# Patient Record
Sex: FEMALE | Race: WHITE | Employment: UNEMPLOYED | URBAN - METROPOLITAN AREA
[De-identification: names, ages, dates, MRNs, and addresses within clinical notes are randomized per-mention and may not be internally consistent; named-entity substitution may affect disease eponyms.]

---

## 2018-08-23 ENCOUNTER — HOSPITAL ENCOUNTER (EMERGENCY)
Age: 33
Discharge: HOME | End: 2018-08-23
Attending: OBSTETRICS & GYNECOLOGY | Admitting: OBSTETRICS & GYNECOLOGY
Payer: COMMERCIAL

## 2018-08-23 VITALS
SYSTOLIC BLOOD PRESSURE: 116 MMHG | HEART RATE: 71 BPM | BODY MASS INDEX: 45.99 KG/M2 | HEIGHT: 67 IN | DIASTOLIC BLOOD PRESSURE: 78 MMHG | TEMPERATURE: 98.1 F | WEIGHT: 293 LBS | OXYGEN SATURATION: 100 % | RESPIRATION RATE: 16 BRPM

## 2018-08-23 PROCEDURE — 59025 FETAL NON-STRESS TEST: CPT

## 2018-08-23 RX ORDER — METHYLPHENIDATE HYDROCHLORIDE 10 MG/1
10 TABLET ORAL DAILY
COMMUNITY
End: 2018-08-26

## 2018-08-23 NOTE — IP AVS SNAPSHOT
[address] Patient: Mayra Zuñiga MRN: [id number] UAV:[id number] About your hospitalization You were admitted on:  N/A You last received care in the:  [address] [place] [address] You were discharged on:  August 23, 2018 Why you were hospitalized Your primary diagnosis was:  Not on File Follow-up Information None Discharge Orders None A check tonio indicates which time of day the medication should be taken. My Medications ASK your doctor about these medications Instructions Each Dose to Equal  
 Morning Noon Evening Bedtime CALCIUM 600 WITH VITAMIN D3 600 mg(1,500mg) -400 unit Cap Generic drug:  Calcium-Cholecalciferol (D3) Your last dose was: Your next dose is: Take  by mouth daily. FISH OIL 1,000 mg Cap Generic drug:  omega-3 fatty acids-vitamin e Your last dose was: Your next dose is: Take 1 Cap by mouth. Indications: 20935 mg  
 1 Cap  
    
   
   
   
  
 ibuprofen 800 mg tablet Commonly known as:  MOTRIN Your last dose was: Your next dose is: Take 1 Tab by mouth every eight (8) hours as needed for Pain. 800 mg  
    
   
   
   
  
 pnv w/o calcium-iron fum-fa 27-1 mg Tab Your last dose was: Your next dose is: Take  by mouth. PROzac 20 mg capsule Generic drug:  FLUoxetine Your last dose was: Your next dose is: Take 20 mg by mouth daily. Indications: ADD/anxiety 20 mg  
    
   
   
   
  
 RITALIN 10 mg tablet Generic drug:  methylphenidate HCl Your last dose was: Your next dose is: Take 10 mg by mouth daily. Indications: Attention-Deficit Hyperactivity Disorder 10 mg Discharge Instructions Tomorrow you have a schedule induction at 0730 at [facility]. Please call back at 0500 am to confirm bed availability at [phone]. You are allowed to eat till midnight. No food after midnight allowed. Introducing \[facility]\"" & HEALTH SERVICES! Seb Harkins introduces [facility] patient portal. Now you can access parts of your medical record, email your doctor's office, and request medication refills online. 1. In your internet browser, go to [email or web address] 2. Click on the First Time User? Click Here link in the Sign In box. You will see the New Member Sign Up page. 3. Enter your [facility] Access Code exactly as it appears below. You will not need to use this code after youve completed the sign-up process. If you do not sign up before the expiration date, you must request a new code. · [facility] Access Code: [email or web address] Expires: 11/21/2018  4:24 PM 
 
4. Enter the last four digits of your Social Security Number (xxxx) and Date of Birth (mm/dd/yyyy) as indicated and click Submit. You will be taken to the next sign-up page. 5. Create a Care[facility] ID. This will be your Care[facility]Sync login ID and cannot be changed, so think of one that is secure and easy to remember. 6. Create a [facility] password. You can change your password at any time. 7. Enter your Password Reset Question and Answer. This can be used at a later time if you forget your password. 8. Enter your e-mail address. You will receive e-mail notification when new information is available in [address] 9. Click Sign Up. You can now view and download portions of your medical record. 10. Click the Download Summary menu link to download a portable copy of your medical information. If you have questions, please visit the Frequently Asked Questions section of the [facility] website. Remember, [facility] is NOT to be used for urgent needs. For medical emergencies, dial 911. Now available from your iPhone and Android! Introducing Roberto Lebron As a [facility] patient, I wanted to make you aware of our electronic visit tool called Roberto Lebron. [email or web address] 24/7 allows you to connect within minutes with a medical provider 24 hours a day, seven days a week via a mobile device or tablet or logging into a secure website from your computer. You can access Roberto Lebron from anywhere in the [place]. A virtual visit might be right for you when you have a simple condition and feel like you just dont want to get out of bed, or cant get away from work for an appointment, when your regular [facility] provider is not available (evenings, weekends or holidays), or when youre out of town and need minor care. Electronic visits cost only $49 and if the [facility] 24/7 provider determines a prescription is needed to treat your condition, one can be electronically transmitted to a nearby pharmacy*. Please take a moment to enroll today if you have not already done so. The enrollment process is free and takes just a few minutes. To enroll, please download the [employer]Want[employer] kayleen to your tablet or phone, or visit [email or web address] to enroll on your computer. And, as an [address] [place] [address] patient with a [employer] account, the results of your visits will be scanned into your electronic medical record and your primary care provider will be able to view the scanned results. We urge you to continue to see your regular [facility] provider for your ongoing medical care. And while your primary care provider may not be the one available when you seek a Roberto Lebron virtual visit, the peace of mind you get from getting a real diagnosis real time can be priceless. For more information on Roberto Lebron, view our Frequently Asked Questions (FAQs) at [email or web address]. Sincerely, 
 
Fady Wang MD 
Chief Medical Officer Zeyad Michael *:  certain medications cannot be prescribed via Roberto Lebron Providers Seen During Your Hospitalization Provider Specialty Primary office phone Yuliet Pritchett 7 Gynecology [phone] Your Primary Care Physician (PCP) Primary Care Physician Office Phone Office Fax NONE ** None ** ** None ** You are allergic to the following Allergen Reactions Egg Derived Itching Swelling Amoxicillin Hives Pcn (Penicillins) Hives Recent Documentation Height Weight BMI OB Status Smoking Status 1.702 m 134.3 kg 46.36 kg/m2 Pregnant Never Smoker Emergency Contacts Name Discharge Info Relation Home Work Mobile Hetal DÍZA  Spouse [3] [phone] Patient Belongings The following personal items are in your possession at time of discharge: 
                             
 
  
  
Discharge Instructions Attachments/References POLYHYDRAMNIOS: GENERAL INFO (ENGLISH) PREGNANCY: PRECAUTIONS (ENGLISH) PREGNANCY: WEEK 40 (ENGLISH) PREGNANCY: KICK COUNTS (ENGLISH) Patient Handouts Learning About Having Too Much Amniotic Fluid What is too much amniotic fluid? While you are pregnant, amniotic fluid in the uterus protects your baby from being bumped or hurt as you move your body. And it keeps your baby at a healthy temperature. The fluid also helps your baby move around. Having too much of this fluid is called polyhydramnios. It means that there's more fluid around your baby than there should be. In some cases, too much amniotic fluid doesn't cause problems. In other cases, it can cause problems such as  labor. Or it may increase your chance of needing a  delivery (). What causes it? The cause of too much amniotic fluid may not be found. But causes may include: · Diabetes in the mother. This includes diabetes that occurs during pregnancy (gestational diabetes). · Problems with the baby's development. Examples are genetic disorders and birth defects. · Being pregnant with twins or more. What are the symptoms? You may not have any symptoms of too much amniotic fluid. Often it's found during a routine ultrasound. But some women do have symptoms, which may include: · A uterus that is larger than expected for the age of the pregnancy. Your doctor will find this out. · Shortness of breath. · Signs of early labor ( contractions). How is it diagnosed? Your doctor can use ultrasound to see if you have too much amniotic fluid. This test is used to measure the pockets of amniotic fluid that surround the baby. If these measurements show too much fluid, more tests may be done to try to find the cause. How is it treated? The type of treatment you get depends on how much amniotic fluid you have. It also depends on how far along you are in your pregnancy and what your symptoms are. Your doctor may use a needle to remove the fluid from the amniotic sac. Or you may be given medicine to help reduce the amount of fluid. Some women may get both treatments or more than one round of treatment. In some cases, if your pregnancy has progressed far enough, your doctor may recommend having your baby early. Other women may not need treatment. Sometimes the problem may correct itself over time. Whether or not you need treatment, you will still be watched closely throughout your pregnancy and labor. Follow-up care is a key part of your treatment and safety. Be sure to make and go to all appointments, and call your doctor if you are having problems. It's also a good idea to know your test results and keep a list of the medicines you take. Where can you learn more? Go to http://jayden-catherine.info/. Enter P121 in the search box to learn more about \"Learning About Having Too Much Amniotic Fluid. \" Current as of: 2017 Content Version: 11.7 © 8808-4403 [employer]. Care instructions adapted under license by [employer] (which disclaims liability or warranty for this information). If you have questions about a medical condition or this instruction, always ask your healthcare professional. [address] any warranty or liability for your use of this information. Pregnancy Precautions: Care Instructions Your Care Instructions There is no sure way to prevent labor before your due date ( labor) or to prevent most other pregnancy problems. But there are things you can do to increase your chances of a healthy pregnancy. Go to your appointments, follow your doctor's advice, and take good care of yourself. Eat well, and exercise (if your doctor agrees). And make sure to drink plenty of water. Follow-up care is a key part of your treatment and safety. Be sure to make and go to all appointments, and call your doctor if you are having problems. It's also a good idea to know your test results and keep a list of the medicines you take. How can you care for yourself at home? · Make sure you go to your prenatal appointments. At each visit, your doctor will check your blood pressure. Your doctor will also check to see if you have protein in your urine. High blood pressure and protein in urine are signs of preeclampsia. This condition can be dangerous for you and your baby. · Drink plenty of fluids, enough so that your urine is light yellow or clear like water. Dehydration can cause contractions. If you have kidney, heart, or liver disease and have to limit fluids, talk with your doctor before you increase the amount of fluids you drink. · Tell your doctor right away if you notice any symptoms of an infection, such as: ¨ Burning when you urinate. ¨ A foul-smelling discharge from your vagina. ¨ Vaginal itching. ¨ Unexplained fever. ¨ Unusual pain or soreness in your uterus or lower belly. · Eat a balanced diet. Include plenty of foods that are high in calcium and iron. ¨ Foods high in calcium include milk, cheese, yogurt, almonds, and broccoli. ¨ Foods high in iron include red meat, shellfish, poultry, eggs, beans, raisins, whole-grain bread, and leafy green vegetables. · Do not smoke. If you need help quitting, talk to your doctor about stop-smoking programs and medicines. These can increase your chances of quitting for good. · Do not drink alcohol or use illegal drugs. · Follow your doctor's directions about activity. Your doctor will let you know how much, if any, exercise you can do. · Ask your doctor if you can have sex. If you are at risk for early labor, your doctor may ask you to not have sex. · Take care to prevent falls. During pregnancy, your joints are loose, and your balance is off. Sports such as bicycling, skiing, or in-line skating can increase your risk of falling. And don't ride horses or motorcycles, dive, water ski, scuba dive, or parachute jump while you are pregnant. · Avoid getting very hot. Do not use saunas or hot tubs. Avoid staying out in the sun in hot weather for long periods. Take acetaminophen (Tylenol) to lower a high fever. · Do not take any over-the-counter or herbal medicines or supplements without talking to your doctor or pharmacist first. 
When should you call for help? Call 911 anytime you think you may need emergency care. For example, call if: 
  · You passed out (lost consciousness).  
  · You have severe vaginal bleeding.  
  · You have severe pain in your belly or pelvis.  
  · You have had fluid gushing or leaking from your vagina and you know or think the umbilical cord is bulging into your vagina. If this happens, immediately get down on your knees so your rear end (buttocks) is higher than your head. This will decrease the pressure on the cord until help arrives. ·  
 Call your doctor now or seek immediate medical care if:   · You have signs of preeclampsia, such as: 
¨ Sudden swelling of your face, hands, or feet. ¨ New vision problems (such as dimness or blurring). ¨ A severe headache.  
  · You have any vaginal bleeding.  
  · You have belly pain or cramping.  
  · You have a fever.  
  · You have had regular contractions (with or without pain) for an hour. This means that you have 8 or more within 1 hour or 4 or more in 20 minutes after you change your position and drink fluids.  
  · You have a sudden release of fluid from your vagina.  
  · You have low back pain or pelvic pressure that does not go away.  
  · You notice that your baby has stopped moving or is moving much less than normal.  
 Watch closely for changes in your health, and be sure to contact your doctor if you have any problems. Where can you learn more? Go to http://jayden-catherine.info/. Enter [id number] in the search box to learn more about \"Pregnancy Precautions: Care Instructions. \" Current as of: November 21, 2017 Content Version: 11.7 © 7070-6759 [employer]. Care instructions adapted under license by [employer] (which disclaims liability or warranty for this information). If you have questions about a medical condition or this instruction, always ask your healthcare professional. [place] [address] any warranty or liability for your use of this information. Week 40 of Your Pregnancy: Care Instructions Your Care Instructions By week 36, you have reached your due date. Your baby could be coming any day. But it's a good idea to think ahead to the next few weeks and what might happen. If this is your first time having a baby, try not to worry. If you don't start labor on your own by 41 or 42 weeks, your doctor may recommend giving you medicines to start labor. This care sheet gives you information about how labor can be started.  It also gives you some ideas about breathing exercises you can do if you start to feel anxious or if you are trying to relax. Follow-up care is a key part of your treatment and safety. Be sure to make and go to all appointments, and call your doctor if you are having problems. It's also a good idea to know your test results and keep a list of the medicines you take. How can you care for yourself at home? Learn how labor can be started · If you and your baby are both healthy and ready, and if your cervix has started to open, your doctor may \"break your water\" (rupture the amniotic sac). This often starts labor. · If your cervix is not quite ready, you may get a medicine called Pitocin through an IV to start contractions. · If your cervix is still very firm, you may have prostaglandin tablets (misoprostol) placed in your vagina to soften the cervix. Try guided imagery to help you relax · Find a comfortable place to sit or lie down. Close your eyes. · Start by just taking a few deep breaths to help you relax. · Picture a setting that is calm and peaceful. This could be a beach, a mountain setting, a meadow, or a scene that you choose. · Imagine your scene, and try to add some detail. For example, is there a breeze? What does the pineda look like? Is it clear, or are there clouds? · It often helps to add a path to your scene. For example, as you enter the meadow, imagine a path leading you through the meadow to the trees on the other side. As you follow the path farther into the [facility] you feel more and more relaxed. · When you are deep into your scene and are feeling relaxed, take a few minutes to breathe slowly and feel the calm. · When you are ready, slowly take yourself out of the scene back to the present. Tell yourself that you will feel relaxed and refreshed and will bring that sense of calm with you. · Count to 3, and open your eyes. Where can you learn more? Go to http://jayden-catherine.info/. Enter I070 in the search box to learn more about \"Week 40 of Your Pregnancy: Care Instructions. \" Current as of: November 21, 2017 Content Version: 11.7 © 9091-9207 [employer]. Care instructions adapted under license by [employer] (which disclaims liability or warranty for this information). If you have questions about a medical condition or this instruction, always ask your healthcare professional. [place] [address] any warranty or liability for your use of this information. Counting Your Baby's Kicks: Care Instructions Your Care Instructions Counting your baby's kicks is one way your doctor can tell that your baby is healthy. Most women-especially in a first pregnancy-feel their baby move for the first time between 16 and 22 weeks. The movement may feel like flutters rather than kicks. Your baby may move more at certain times of the day. When you are active, you may notice less kicking than when you are resting. At your prenatal visits, your doctor will ask whether the baby is active. In your last trimester, your doctor may ask you to count the number of times you feel your baby move. Follow-up care is a key part of your treatment and safety. Be sure to make and go to all appointments, and call your doctor if you are having problems. It's also a good idea to know your test results and keep a list of the medicines you take. How do you count fetal kicks? · A common method of checking your baby's movement is to count the number of kicks or moves you feel in 1 hour. Ten movements (such as kicks, flutters, or rolls) in 1 hour are normal. Some doctors suggest that you count in the morning until you get to 10 movements. Then you can quit for that day and start again the next day. · Pick your baby's most active time of day to count. This may be any time from morning to evening. · If you do not feel 10 movements in an hour, your baby may be sleeping. Wait for the next hour and count again. When should you call for help? Call your doctor now or seek immediate medical care if: 
  · You noticed that your baby has stopped moving or is moving much less than normal.  
 Watch closely for changes in your health, and be sure to contact your doctor if you have any problems. Where can you learn more? Go to http://jayden-catherine.info/. Enter Q850 in the search box to learn more about \"Counting Your Baby's Kicks: Care Instructions. \" Current as of: November 21, 2017 Content Version: 11.7 © 7320-4426 [employer]. Care instructions adapted under license by [employer] (which disclaims liability or warranty for this information). If you have questions about a medical condition or this instruction, always ask your healthcare professional. [address] any warranty or liability for your use of this information. Please provide this summary of care documentation to your next provider. Signatures-by signing, you are acknowledging that this After Visit Summary has been reviewed with you and you have received a copy. Patient Signature:  ____________________________________________________________ Date:  ____________________________________________________________  
  
Josph Perfect Provider Signature:  ____________________________________________________________ Date:  ____________________________________________________________

## 2018-08-23 NOTE — IP AVS SNAPSHOT
Summary of Care Report The Summary of Care report has been created to help improve care coordination. Users with access to Charge Payment or [address] (Web-based application) may access additional patient information including the Discharge Summary. If you are not currently a [address] user and need more information, please call the number listed below in the [place] section and ask to be connected with Medical Records. Facility Information Name Address Phone [facility] [address] [facility] [address] [phone] Patient Information Patient Name Sex SUDHIR Lovelace ([id number]) Female 1985 Discharge Information Admitting Provider Service Area Unit Ne Brady MD / Harjinder Acosta 92 3 Labor & Delivery / [phone] Discharge Provider Discharge Date/Time Discharge Disposition Destination (none) (none) (none) (none) Patient Language Language ENGLISH [13] Non-Hospital Problems as of 2018  Never Reviewed Class Noted - Resolved Last Modified Active Problems Supervision of other normal pregnancy  2015 - Present 2015 by Lashae Hoyt NP Entered by Lashae Hoyt NP You are allergic to the following Allergen Reactions Egg Derived Itching Swelling Amoxicillin Hives Pcn (Penicillins) Hives Current Discharge Medication List  
  
ASK your doctor about these medications Dose & Instructions Dispensing Information Comments CALCIUM 600 WITH VITAMIN D3 600 mg(1,500mg) -400 unit Cap Generic drug:  Calcium-Cholecalciferol (D3) Take  by mouth daily. Refills:  0  
   
 FISH OIL 1,000 mg Cap Generic drug:  omega-3 fatty acids-vitamin e Dose:  1 Cap Take 1 Cap by mouth. Indications: 32429 mg Refills:  0 ibuprofen 800 mg tablet Commonly known as:  MOTRIN Dose:  800 mg Take 1 Tab by mouth every eight (8) hours as needed for Pain. Quantity:  30 Tab Refills:  1 pnv w/o calcium-iron fum-fa 27-1 mg Tab Take  by mouth. Refills:  0 PROzac 20 mg capsule Generic drug:  FLUoxetine Dose:  20 mg Take 20 mg by mouth daily. Indications: ADD/anxiety Refills:  0  
   
 RITALIN 10 mg tablet Generic drug:  methylphenidate HCl Dose:  10 mg Take 10 mg by mouth daily. Indications: Attention-Deficit Hyperactivity Disorder Refills:  0 Follow-up Information None Discharge Instructions Tomorrow you have a schedule induction at 0730 at [facility]. Please call back at 0500 am to confirm bed availability at [phone]. You are allowed to eat till midnight. No food after midnight allowed. Chart Review Routing History No Routing History on File

## 2018-08-23 NOTE — DISCHARGE INSTRUCTIONS
Tomorrow you have a schedule induction at 0730 at [facility]. Please call back at 0500 am to confirm bed availability at [phone]. You are allowed to eat till midnight. No food after midnight allowed.

## 2018-08-23 NOTE — PROGRESS NOTES
0851:, Pt placed in monitors. Denies headache, vision changes, or epigastric pain, denies fluid leakage. Pt NST due to polyhydramnios. 1540: Offered patient apple juice. 1700: Pt discharged, induction schedule for tomorrow at 0730. Advised to call at 0500 to check bed availability. Discharge instructions given.

## 2018-08-24 ENCOUNTER — HOSPITAL ENCOUNTER (INPATIENT)
Age: 33
LOS: 2 days | Discharge: HOME | End: 2018-08-26
Attending: OBSTETRICS & GYNECOLOGY | Admitting: OBSTETRICS & GYNECOLOGY
Payer: COMMERCIAL

## 2018-08-24 PROBLEM — O40.9XX0 POLYHYDRAMNIOS AFFECTING PREGNANCY: Status: RESOLVED | Noted: 2018-08-24 | Resolved: 2018-08-24

## 2018-08-24 PROBLEM — Z87.59 HISTORY OF POSTPARTUM DEPRESSION: Status: ACTIVE | Noted: 2018-08-24

## 2018-08-24 PROBLEM — E66.9 OBESITY: Status: ACTIVE | Noted: 2018-08-24

## 2018-08-24 PROBLEM — F41.9 ANXIETY: Status: ACTIVE | Noted: 2018-08-24

## 2018-08-24 PROBLEM — O40.9XX0 POLYHYDRAMNIOS AFFECTING PREGNANCY: Status: ACTIVE | Noted: 2018-08-24

## 2018-08-24 PROBLEM — Z86.59 HISTORY OF POSTPARTUM DEPRESSION: Status: ACTIVE | Noted: 2018-08-24

## 2018-08-24 LAB
ABO + RH BLD: NORMAL
BASOPHILS # BLD: 0 K/UL (ref 0–0.1)
BASOPHILS NFR BLD: 0 % (ref 0–2)
BLOOD GROUP ANTIBODIES SERPL: NORMAL
DIFFERENTIAL METHOD BLD: ABNORMAL
EOSINOPHIL # BLD: 0.1 K/UL (ref 0–0.4)
EOSINOPHIL NFR BLD: 1 % (ref 0–5)
ERYTHROCYTE [DISTWIDTH] IN BLOOD BY AUTOMATED COUNT: 14 % (ref 11.6–14.5)
HCT VFR BLD AUTO: 33 % (ref 35–45)
HGB BLD-MCNC: 11 G/DL (ref 12–16)
LYMPHOCYTES # BLD: 1.7 K/UL (ref 0.9–3.6)
LYMPHOCYTES NFR BLD: 16 % (ref 21–52)
MCH RBC QN AUTO: 30.2 PG (ref 24–34)
MCHC RBC AUTO-ENTMCNC: 33.3 G/DL (ref 31–37)
MCV RBC AUTO: 90.7 FL (ref 74–97)
MONOCYTES # BLD: 0.7 K/UL (ref 0.05–1.2)
MONOCYTES NFR BLD: 7 % (ref 3–10)
NEUTS SEG # BLD: 8 K/UL (ref 1.8–8)
NEUTS SEG NFR BLD: 76 % (ref 40–73)
PLATELET # BLD AUTO: 224 K/UL (ref 135–420)
PMV BLD AUTO: 11.6 FL (ref 9.2–11.8)
RBC # BLD AUTO: 3.64 M/UL (ref 4.2–5.3)
SPECIMEN EXP DATE BLD: NORMAL
WBC # BLD AUTO: 10.5 K/UL (ref 4.6–13.2)

## 2018-08-24 PROCEDURE — 75410000000 HC DELIVERY VAGINAL/SINGLE

## 2018-08-24 PROCEDURE — 10907ZC DRAINAGE OF AMNIOTIC FLUID, THERAPEUTIC FROM PRODUCTS OF CONCEPTION, VIA NATURAL OR ARTIFICIAL OPENING: ICD-10-PCS | Performed by: OBSTETRICS & GYNECOLOGY

## 2018-08-24 PROCEDURE — 65270000029 HC RM PRIVATE

## 2018-08-24 PROCEDURE — 85025 COMPLETE CBC W/AUTO DIFF WBC: CPT | Performed by: ADVANCED PRACTICE MIDWIFE

## 2018-08-24 PROCEDURE — 74011250637 HC RX REV CODE- 250/637: Performed by: ADVANCED PRACTICE MIDWIFE

## 2018-08-24 PROCEDURE — 75410000003 HC RECOV DEL/VAG/CSECN EA 0.5 HR

## 2018-08-24 PROCEDURE — 74011250636 HC RX REV CODE- 250/636: Performed by: ADVANCED PRACTICE MIDWIFE

## 2018-08-24 PROCEDURE — 75410000002 HC LABOR FEE PER 1 HR

## 2018-08-24 PROCEDURE — 86900 BLOOD TYPING SEROLOGIC ABO: CPT | Performed by: ADVANCED PRACTICE MIDWIFE

## 2018-08-24 PROCEDURE — 77010026065 HC OXYGEN MINIMUM MEDICAL AIR

## 2018-08-24 RX ORDER — TERBUTALINE SULFATE 1 MG/ML
0.25 INJECTION SUBCUTANEOUS
Status: DISCONTINUED | OUTPATIENT
Start: 2018-08-24 | End: 2018-08-24

## 2018-08-24 RX ORDER — SALICYLIC ACID
90 POWDER (GRAM) MISCELLANEOUS ONCE
Status: COMPLETED | OUTPATIENT
Start: 2018-08-24 | End: 2018-08-24

## 2018-08-24 RX ORDER — MISOPROSTOL 200 UG/1
800 TABLET ORAL
Status: DISCONTINUED | OUTPATIENT
Start: 2018-08-24 | End: 2018-08-24

## 2018-08-24 RX ORDER — FLUOXETINE HYDROCHLORIDE 20 MG/1
20 CAPSULE ORAL DAILY
Status: DISCONTINUED | OUTPATIENT
Start: 2018-08-25 | End: 2018-08-26 | Stop reason: HOSPADM

## 2018-08-24 RX ORDER — ACETAMINOPHEN 325 MG/1
650 TABLET ORAL
Status: DISCONTINUED | OUTPATIENT
Start: 2018-08-24 | End: 2018-08-26 | Stop reason: HOSPADM

## 2018-08-24 RX ORDER — HYDROMORPHONE HYDROCHLORIDE 1 MG/ML
1 INJECTION, SOLUTION INTRAMUSCULAR; INTRAVENOUS; SUBCUTANEOUS
Status: DISCONTINUED | OUTPATIENT
Start: 2018-08-24 | End: 2018-08-24

## 2018-08-24 RX ORDER — LIDOCAINE HYDROCHLORIDE 10 MG/ML
20 INJECTION, SOLUTION EPIDURAL; INFILTRATION; INTRACAUDAL; PERINEURAL AS NEEDED
Status: DISCONTINUED | OUTPATIENT
Start: 2018-08-24 | End: 2018-08-24

## 2018-08-24 RX ORDER — PROMETHAZINE HYDROCHLORIDE 25 MG/ML
25 INJECTION, SOLUTION INTRAMUSCULAR; INTRAVENOUS
Status: DISCONTINUED | OUTPATIENT
Start: 2018-08-24 | End: 2018-08-26 | Stop reason: HOSPADM

## 2018-08-24 RX ORDER — AMOXICILLIN 250 MG
1 CAPSULE ORAL
Status: DISCONTINUED | OUTPATIENT
Start: 2018-08-24 | End: 2018-08-26 | Stop reason: HOSPADM

## 2018-08-24 RX ORDER — SODIUM CHLORIDE, SODIUM LACTATE, POTASSIUM CHLORIDE, CALCIUM CHLORIDE 600; 310; 30; 20 MG/100ML; MG/100ML; MG/100ML; MG/100ML
125 INJECTION, SOLUTION INTRAVENOUS CONTINUOUS
Status: DISCONTINUED | OUTPATIENT
Start: 2018-08-24 | End: 2018-08-24

## 2018-08-24 RX ORDER — BUTORPHANOL TARTRATE 1 MG/ML
2 INJECTION INTRAMUSCULAR; INTRAVENOUS
Status: DISCONTINUED | OUTPATIENT
Start: 2018-08-24 | End: 2018-08-24

## 2018-08-24 RX ORDER — IBUPROFEN 400 MG/1
800 TABLET ORAL
Status: DISCONTINUED | OUTPATIENT
Start: 2018-08-24 | End: 2018-08-26 | Stop reason: HOSPADM

## 2018-08-24 RX ORDER — OXYTOCIN/RINGER'S LACTATE 20/1000 ML
125 PLASTIC BAG, INJECTION (ML) INTRAVENOUS CONTINUOUS
Status: DISCONTINUED | OUTPATIENT
Start: 2018-08-24 | End: 2018-08-24

## 2018-08-24 RX ORDER — NALBUPHINE HYDROCHLORIDE 10 MG/ML
10 INJECTION, SOLUTION INTRAMUSCULAR; INTRAVENOUS; SUBCUTANEOUS
Status: DISCONTINUED | OUTPATIENT
Start: 2018-08-24 | End: 2018-08-24

## 2018-08-24 RX ORDER — METHYLERGONOVINE MALEATE 0.2 MG/ML
0.2 INJECTION INTRAVENOUS AS NEEDED
Status: DISCONTINUED | OUTPATIENT
Start: 2018-08-24 | End: 2018-08-24

## 2018-08-24 RX ORDER — HYDROCORTISONE 25 MG/G
CREAM TOPICAL 2 TIMES DAILY
Status: DISCONTINUED | OUTPATIENT
Start: 2018-08-24 | End: 2018-08-26 | Stop reason: HOSPADM

## 2018-08-24 RX ADMIN — HYDROCORTISONE 2.5%: 25 CREAM TOPICAL at 18:00

## 2018-08-24 RX ADMIN — CASTOR OIL 90 ML: 1 LIQUID ORAL at 13:42

## 2018-08-24 RX ADMIN — ACETAMINOPHEN 650 MG: 325 TABLET, FILM COATED ORAL at 23:03

## 2018-08-24 RX ADMIN — IBUPROFEN 800 MG: 400 TABLET ORAL at 23:03

## 2018-08-24 RX ADMIN — IBUPROFEN 800 MG: 400 TABLET ORAL at 14:36

## 2018-08-24 RX ADMIN — ACETAMINOPHEN 650 MG: 325 TABLET, FILM COATED ORAL at 19:00

## 2018-08-24 RX ADMIN — Medication 125 ML/HR: at 13:47

## 2018-08-24 RX ADMIN — SODIUM CHLORIDE, SODIUM LACTATE, POTASSIUM CHLORIDE, AND CALCIUM CHLORIDE 500 ML: 600; 310; 30; 20 INJECTION, SOLUTION INTRAVENOUS at 13:32

## 2018-08-24 NOTE — PROGRESS NOTES
Bedside shift change report given to ANGEL Granda RN (oncoming nurse) by Gutierrez Baldwin. Araceli Mccullough RN (offgoing nurse). Report included the following information SBAR, Kardex, Intake/Output and MAR.

## 2018-08-24 NOTE — PROGRESS NOTES
Labor Progress Note  Estrella Mckeon is laboring well with natural labor methods.  is providing labor support with counterpressure to her back. Fetal heart rate and contraction pattern evaluated, patient examined. Physical Exam:  Cervical Exam:  /-2  Membranes:  Artificial Rupture of Membranes; Amniotic Fluid: large amount of clear fluid  Uterine Activity: Frequency: Every 2-3 minutes and Duration: 60 seconds  Fetal Heart Rate: Baseline: 135 per minute  Variability: moderate  Accelerations: yes  Decelerations: variable    Assessment: IUP 40w0d; FHR Category II; Polyhydramnios; FSE and IUPC in place  Plan: Expectant management of labor, close monitoring of fetal and maternal wellbeing; anticipate .

## 2018-08-24 NOTE — IP AVS SNAPSHOT
Soledad Ureña 
 
 
 [address] [place] [address] Patient: Delfino Mccann MRN: [id number] JVC:[id number] About your hospitalization You were admitted on:  August 24, 2018 You last received care in the:  [place] [address] You were discharged on:  August 26, 2018 Why you were hospitalized Your primary diagnosis was:  Postpartum Care Following Vaginal Delivery Your diagnoses also included:  Labor And Delivery Indication For Care Or Intervention, History Of Postpartum Depression, Postpartum Depression, Anxiety, Obesity, Polyhydramnios Affecting Pregnancy Follow-up Information Follow up With Details Comments Contact Info None   None ([phone]) Patient stated that they have no PCP Adrienne Galicia CNM In 6 weeks postpartum follow-up [id number] [place] [id number] 
[phone] Discharge Orders None A check tonio indicates which time of day the medication should be taken. My Medications START taking these medications Instructions Each Dose to Equal  
 Morning Noon Evening Bedtime  
 ibuprofen 800 mg tablet Commonly known as:  MOTRIN Your last dose was: Your next dose is: Take 1 Tab by mouth every eight (8) hours as needed. 800 mg CONTINUE taking these medications Instructions Each Dose to Equal  
 Morning Noon Evening Bedtime CALCIUM 600 WITH VITAMIN D3 600 mg(1,500mg) -400 unit Cap Generic drug:  Calcium-Cholecalciferol (D3) Your last dose was: Your next dose is: Take  by mouth daily. FISH OIL 1,000 mg Cap Generic drug:  omega-3 fatty acids-vitamin e Your last dose was: Your next dose is: Take 1 Cap by mouth. Indications: 23394 mg  
 1 Cap  
    
   
   
   
  
 pnv w/o calcium-iron fum-fa 27-1 mg Tab Your last dose was: Your next dose is: Take  by mouth. PROzac 20 mg capsule Generic drug:  FLUoxetine Your last dose was: Your next dose is: Take 20 mg by mouth daily. Indications: ADD/anxiety 20 mg  
    
   
   
   
  
  
STOP taking these medications RITALIN 10 mg tablet Generic drug:  methylphenidate HCl Where to Get Your Medications Information on where to get these meds will be given to you by the nurse or doctor. ! Ask your nurse or doctor about these medications  
  ibuprofen 800 mg tablet Discharge Instructions CONGRATULATIONS ON THE BIRTH OF YOUR BABY! The first six weeks after childbirth is a time of physical and emotional adjustment. This handout will help to answer questions and provide guidance during the postpartum period. Every family's adjustment is unique, so please call if you have further concerns. At anytime we can be reached at [phone]. During office hours please ask to speak to a charge nurse. After hours, the answering service will take a message and the Nurse-Midwife on-call will return your call. If your question can wait until office hours: Monday-Friday 8:30-4:00, please do so. For emergencies or urgent concerns do not hesitate to call us after hours. DIET Your body is in need of a well-balanced, high protein diet to recuperate from birth. Please continue to take your prenatal vitamins for 6 weeks or as long as you are breastfeeding. Continue to drink at least 6-8 cups of water or other liquid a day. A breastfeeding mother also needs extra protein, calories and calcium containing foods. It is a good rule to drink fluids with every feeding in order to maintain an adequate milk supply and avoid dehydration.   Your baby will probably not be bothered by things in your diet, but if the baby seems extremely fussy or develops a rash, you may want to discuss possible food intolerances with your baby's care provider. PAIN MEDICATIONS Acetaminophen (Tylenol), ibuprofen (Motrin), or other prescribed pain medication may be taken as directed to relieve discomfort. The above medications pass in very minimal amounts into the breast milk and usually will not cause problems. There are medications that may affect the baby, so please consult your baby's care provider before taking medication. If you are breastfeeding, be sure to mention this to any care provider you see so that medications that are safe may be selected. There is an excellent resource called Nulu that is a resource for medication safety in pregnancy and lactation. You can visit their website at emids/ or call them toll free at [phone] if you have any questions about medication safety. UTERINE INVOLUTION / VAGINAL BLEEDING Involution is the process of the uterus returning to pre-pregnant size. It will take approximately six weeks for this process to occur. To achieve this size your uterus becomes firm to slow bleeding loss from the placental site. The first 7 days after birth, the bleeding is red and heavy. It may change with your activity and position. Some small clots are normal.   After ten days, the bleeding should be pale pink and slowed considerably. The next several weeks may progress to a pink, mucousy discharge. This may continue for 6-8 weeks, depending on your activity. During the first four weeks after delivery we recommend using sanitary pads instead of tampons. Douching should also be avoided, but it is fine to take a tub bath so long as the tub is very clean. ACTIVITY/EXERCISE Adequate rest is essential to recovery. Try to rest or sleep when the baby sleeps. After two weeks, you may begin going for short walks, doing Kegel exercises and abdominal crunches.   Avoid heavy, jarring or aerobic exercises. Remember to start out slowly and build up to your previous fitness level. Use common sense and don't overdo as rest is important and the benefits of increased rest are a quicker recovery. For the first two weeks after a  try to limit trips up or down steps. Do not lift anything heavier than the baby during this time. Lifting the baby or other objects should be done by bending at the knees rather than the waist.  Driving should be avoided during the first two to three weeks until you have the strength to push firmly on the brakes in case of an emergency. You may ride as a passenger, but DO wear a seat belt at all times. After a few weeks, you may resume normal activity at whatever pace is comfortable for you. Exercise may also be resumed gradually. Walking is a good way to start. Finally, try to be reasonable in your expectations. Caring for a new baby after major surgery can be quite trying. Arrange for assistance at home to ensure that you get enough rest.  
 
POSTPARTUM CHECK You may call the office when you return home to set up a postpartum visit. Most patients will be seen at 6 weeks after delivery, but after a  or other circumstances you may be seen in 2 weeks or less. If you are discharged from the hospital with staples that must be removed, you will be asked to come in sooner. At your postpartum visit, a pelvic exam may be performed. If you are having any problems or concerns, please do not hesitate to call. Once again our number is [phone]. MOOD CHANGES Significant hormonal changes occur in the days following delivery, and as a result, many women experience brief episodes of tearfulness or feeling \"blue. \"  These emotional swings may be made worse by lack of sleep and by the adjustments inherent in becoming a mother. For some women, these fluctuations are minor.   For others, they are overwhelming; creating feelings of anxiety, depression, or the inability to cope. If you have difficulty functioning as a result of feeling down, or if the mood changes seem severe, do not improve, or result is thoughts of harming yourself or others CALL RIGHT AWAY. PERINEAL CARE The basic goals of perineal care are to prevent infection, to relieve pain and promote healing. Your stitches will dissolve in four to six weeks, and do not need to be removed. After urinating, please continue to clean with warm water from front to back. Please continue sitz baths as instructed twice a day for a week or as needed. Call the office if you see pus in the suture site, or have unusual or severe swelling or pain that seems to be getting worse. RETURN OF MENSTRUATION Your first menstrual period may occur as soon as four to six weeks after your delivery if you are not breast-feeding. If breast-feeding it is more difficult to predict when your first period will occur. Even if you are not yet menstruating, you may be ovulating and it may be possible to conceive again. It is common for your first period after childbirth to be very heavy with an increased amount of cramping. BREASTS Breast-feeding Mothers: Colostrum is excreted in the first 24-72 hours. Mature breast milk will appear on the 2nd to 5th day. Engorgement may occur with the mature milk making your breasts feel warm and very full. Frequent feedings will make you more comfortable. Babies do not nurse on regular schedules. Nursing every 1 1/2 to 2 hours is normal and frequent feeding DOES NOT mean you are not making enough milk. To avoid nipple confusion, do not give bottles for the first 4 weeks. Growth spurts are common and may require more frequent feedings. This is the way baby increases your milk supply. During a growth spurt, you may feel you are feeding very frequently and that your breasts are \"empty. \"  Don't worry, your milk is produced by supply and demand so this increased frequency of feeding will increase your milk supply within 48 hours. Sore nipples may occur with frequent feedings and are sometimes also caused by improper latch. Check for a proper latch. Baby should have a wide open mouth. Use different positions at each feeding if possible. Express a small amount of colostrum or breast milk onto the sore area and leave bra flaps unlatched until dry. The lactation consultant at [facility] is available for outpatient consultation without charge. Call [phone] from Monday-Friday 9:00am- 3:00pm to arrange an outpatient appointment with her. Local [facility] and consultants may also be very helpful. If You Are Not Breast-feeding: You will experience swelling, engorgement and some milk production. There are no safe medications available to stop lactation. Some remedies for engorgement include: wearing a tight bra, ice packs and cold green cabbage leaves placed between the breast and your bra. Change these frequently. Tylenol or Motrin should help with the discomfort. SEXUAL ADJUSTMENTS We recommend that you wait at least four weeks before resuming sexual intercourse. A sore perineum, a demanding baby and fatigue will certainly affect your ability to enjoy lovemaking! A vaginal lubricant is recommended to help with any dryness. It is very important to remember that you will ovulate BEFORE your first period and can conceive. If you do not wish another pregnancy right away, please take precautions to avoid pregnancy. If you would like a prescription method of birth control, please discuss this with us at your 6 week visit. ELIMINATION We remind all postpartum patients that it may take a few days for your bowels to return to normal, especially if you had a long labor.   For those who had C-sections or severe lacerations, we recommend that you use a stool softener twice daily for at least two weeks. Many stool softeners are over-the-counter. Colace (Docusate Sodium) is recommended. Bulk forming agents such as Metamucil or Fibercon may be used daily in addition to a stool softener to promote regular bowel movements. Eating fresh fruits and vegetables along with whole grains is helpful as well. Do not be afraid to have a bowel movement as your stitches will not \"come out\" in the course of having a bowel movement. Urination may be difficult due to soreness around the urethra, or as an after effect of epidural.  This is temporary and can be helped  by squirting water over the perineum or try going in the shower. Hemorrhoids are common after birth. Tucks pads, Anusol cream and avoiding constipation are helpful. If constipation does occur, you may take Milk of Magnesia or Senekot according to the package instructions. DANGER SIGNS! CALL WITHOUT DELAY IF YOU ARE EXPERIENCING ANY OF THE FOLLOWING: 
* Unusually heavy bleeding, soaking more than 1 or more pads in an hour. * Vaginal discharge with strong foul odor. * Fever of 101 or higher * Unusual pain or tenderness in the abdominal area. * If breasts are red, hot or have a painful lump. * Depression that persists longer than 1-2 weeks or is severe. * Any urinary frequency accompanied by urgency or pain. * A lump in leg or calf especially if painful, warm or red. We thank you for choosing us for your prenatal care and/or delivery. We wish you all happiness and health with your baby for his or her lifetime! Monroe Marcano MD  
 
 
Patient armband removed and shredded mygolahart Announcement We are excited to announce that we are making your provider's discharge notes available to you in [facility]ary. You will see these notes when they are completed and signed by the physician that discharged you from your recent hospital stay.   If you have any questions or concerns about any information you see in coin4ce, please call the Health Information Department where you were seen or reach out to your Primary Care Provider for more information about your plan of care. Introducing [facility]! [employer] introduces [employer] patient portal. Now you can access parts of your medical record, email your doctor's office, and request medication refills online. 1. In your internet browser, go to [email or web address] 2. Click on the First Time User? Click Here link in the Sign In box. You will see the New Member Sign Up page. 3. Enter your coin4ce Access Code exactly as it appears below. You will not need to use this code after youve completed the sign-up process. If you do not sign up before the expiration date, you must request a new code. · coin4ce Access Code: [email or web address] Expires: 11/21/2018  4:24 PM 
 
4. Enter the last four digits of your Social Security Number (xxxx) and Date of Birth (mm/dd/yyyy) as indicated and click Submit. You will be taken to the next sign-up page. 5. Create a coin4ce ID. This will be your coin4ce login ID and cannot be changed, so think of one that is secure and easy to remember. 6. Create a coin4ce password. You can change your password at any time. 7. Enter your Password Reset Question and Answer. This can be used at a later time if you forget your password. 8. Enter your e-mail address. You will receive e-mail notification when new information is available in [address] 9. Click Sign Up. You can now view and download portions of your medical record. 10. Click the Download Summary menu link to download a portable copy of your medical information. If you have questions, please visit the Frequently Asked Questions section of the coin4ce website. Remember, coin4ce is NOT to be used for urgent needs. For medical emergencies, dial 911. Now available from your iPhone and Android! Introducing Roberto Lebron As a [employer] patient, I wanted to make you aware of our electronic visit tool called Roberto Lebron. [employer] 24/7 allows you to connect within minutes with a medical provider 24 hours a day, seven days a week via a mobile device or tablet or logging into a secure website from your computer. You can access Roberto Navarretefin from anywhere in the [place]. A virtual visit might be right for you when you have a simple condition and feel like you just dont want to get out of bed, or cant get away from work for an appointment, when your regular [employer] provider is not available (evenings, weekends or holidays), or when youre out of town and need minor care. Electronic visits cost only $49 and if the [employer] 24/7 provider determines a prescription is needed to treat your condition, one can be electronically transmitted to a nearby pharmacy*. Please take a moment to enroll today if you have not already done so. The enrollment process is free and takes just a few minutes. To enroll, please download the [employer] 24/7 kayleen to your tablet or phone, or visit [email or web address] to enroll on your computer. And, as an [address] [place] [address] patient with a [employer] account, the results of your visits will be scanned into your electronic medical record and your primary care provider will be able to view the scanned results. We urge you to continue to see your regular [employer] provider for your ongoing medical care. And while your primary care provider may not be the one available when you seek a Roberto Lyonsdenafin virtual visit, the peace of mind you get from getting a real diagnosis real time can be priceless. For more information on Roberto Lyonsdenafin, view our Frequently Asked Questions (FAQs) at [email or web address]. Sincerely, 
 
Mera Hernandez MD 
Chief Medical Officer Araceli8 Juliana Michael *:  certain medications cannot be prescribed via Roberto Lebron Providers Seen During Your Hospitalization Provider Specialty Primary office phone Jo Schwab, MD Obstetrics & Gynecology [phone] Your Primary Care Physician (PCP) Primary Care Physician Office Phone Office Fax NONE ** None ** ** None ** You are allergic to the following Allergen Reactions Egg Derived Itching Swelling Amoxicillin Hives Pcn (Penicillins) Hives Recent Documentation Height Weight Breastfeeding? BMI OB Status Smoking Status 1.702 m 134.3 kg Unknown 46.36 kg/m2 Recent pregnancy Never Smoker Emergency Contacts Name Discharge Info Relation Home Work Mobile MekaseypattiJeffy DISCHARGE CAREGIVER [3] Spouse [3] [phone] Patient Belongings The following personal items are in your possession at time of discharge: 
  Dental Appliances: None  Visual Aid: None      Home Medications: None   Jewelry: None  Clothing: At bedside    Other Valuables: Cell Phone, At bedside  Personal Items Sent to Safe: no 
 
  
  
Discharge Instructions Attachments/References DEPRESSION: POSTPARTUM (ENGLISH) Patient Handouts Depression After Childbirth: Care Instructions Your Care Instructions Many women get the \"baby blues\" during the first few days after childbirth. You may lose sleep, feel irritable, and cry easily. You may feel happy one minute and sad the next. Hormone changes are one cause of these emotional changes. Also, the demands of a new baby, along with visits from relatives or other family needs, add to a mother's stress. The \"baby blues\" often peak around the fourth day. Then they ease up in less than 2 weeks. If your moodiness or anxiety lasts for more than 2 weeks, or if you feel like life is not worth living, you may have postpartum depression.  This is different for each mother. Some mothers with serious depression may worry intensely about their infant's well-being. Others may feel distant from their child. Some mothers might even feel that they might harm their baby. A mother may have signs of paranoia, wondering if someone is watching her. Depression is not a sign of weakness. It is a medical condition that requires treatment. Medicine and counseling often work well to reduce depression. Talk to your doctor about taking antidepressant medicine while breastfeeding. Follow-up care is a key part of your treatment and safety. Be sure to make and go to all appointments, and call your doctor if you are having problems. It's also a good idea to know your test results and keep a list of the medicines you take. How do you know if you are depressed? With all the changes in your life, you may not know if you are depressed. Pregnancy sometimes causes changes in how you feel that are similar to the symptoms of depression. Symptoms of depression include: · Feeling sad or hopeless and losing interest in daily activities. These are the most common symptoms of depression. · Sleeping too much or not enough. · Feeling tired. You may feel as if you have no energy. · Eating too much or too little. · Writing or talking about death, such as writing suicide notes or talking about guns, knives, or pills. Keep the numbers for these national suicide hotlines: 6-557-963-TALK (5-492.968.2711) and 6-230-NEIEQKD (6-257.230.3775). If you or someone you know talks about suicide or feeling hopeless, get help right away. How can you care for yourself at home? · Be safe with medicines. Take your medicines exactly as prescribed. Call your doctor if you think you are having a problem with your medicine. · Eat a healthy diet so that you can keep up your energy. · Get regular daily exercise, such as walks, to help improve your mood. · Get as much sunlight as possible. Keep your shades and curtains open. Get outside as much as you can. · Avoid using alcohol or other substances to feel better. · Get as much rest and sleep as possible. Avoid doing too much. Being too tired can increase depression. · Play stimulating music throughout your day and soothing music at night. · Schedule outings and visits with friends and family. Ask them to call you regularly, so that you do not feel alone. · Ask for help with preparing food and other daily tasks. Family and friends are often happy to help a mother with a . · Be honest with yourself and those who care about you. Tell them about your struggle. · Join a support group of new mothers. No one can better understand the challenges of caring for a  than other new mothers. · If you feel like life is not worth living or are feeling hopeless, get help right away. Keep the numbers for these national suicide hotlines: 5-976-746-TALK (2-785.763.4913) and 8-823-TDLWWXZ (1-327.520.4226). When should you call for help? Call 911 anytime you think you may need emergency care. For example, call if: 
  · You feel you cannot stop from hurting yourself, your baby, or someone else.  
[facility] your doctor now or seek immediate medical care if: 
  · You are having trouble caring for yourself or your baby.  
  · You hear voices.  
Matt Red[place] closely for changes in your health, and be sure to contact your doctor if: 
  · You have problems with your depression medicine.  
  · You do not get better as expected. Where can you learn more? Go to http://jayden-catherine.info/. Enter I874 in the search box to learn more about \"Depression After Childbirth: Care Instructions. \" Current as of: 2017 Content Version: 11.7 © 1818-6627 kites[employer].  Care instructions adapted under license by [employer] (which disclaims liability or warranty for this information). If you have questions about a medical condition or this instruction, always ask your healthcare professional. [address] any warranty or liability for your use of this information. Please provide this summary of care documentation to your next provider. Signatures-by signing, you are acknowledging that this After Visit Summary has been reviewed with you and you have received a copy. Patient Signature:  ____________________________________________________________ Date:  ____________________________________________________________  
  
Aloma Snellen Provider Signature:  ____________________________________________________________ Date:  ____________________________________________________________

## 2018-08-24 NOTE — ROUTINE PROCESS
Bedside and Verbal shift change report given to GWENDOLYN Lama RN (oncoming nurse) by Maite Silva. Stephanie Perea RN (offgoing nurse). Report included the following information SBAR, MAR and Recent Results.

## 2018-08-24 NOTE — PROGRESS NOTES
~~ 0820 REC  @ 40 WKS TODAY HERE FOR IND FOR POLYHYDRAMNIOS. PT DENIES REG CTX, SROM OR VAG BLEEDING. +FM PER PT.  PT GOT HER PNC FROM A DIFF GROUP TRANSFERING  TO [facility] YESTERDAY WHERE SHE WAS FOUND TO BE 5CM & POLY--  PT WANTS TO STAY IN OWN GOWN, INTO BED, EFM APPLIED. FHT 140S. PT SITTING, SR UP X2, CB IN REACH, PTS MOM HERE-     ~~0830 PT GIVEN CONSENTS TO READ & SIGN-- TOLD TO CALL W/ ?S    ~~ 0909  DUE TO UNIT CENSUS & ACUITY WILL HAVE TO HOLD THE ADMISSION PROCESS- PT AWARE    ~~0920 W/ PT LEANING FORWARD & D/T OBESE ABD FHT NT TRACING-  EFM REPOS-   EFM REPOS, FHT 120S    ~~ 0946 N LISA JINM IN TO SEE PT & DISCUSS POC-  VE DONE, PT JACOB WELL, NO BLOOD ON GLOVE. REVIEWED DOING AROM IND & ADD PIT IF NEEDED- PT AGREES. WILL START IV PRIOR TO AROM    ~~1000 IV STARTED W/ #18 ANGIO, ADMIT LABS DRAWN. 1000CC LR UP & INFUSING W/O SIGN OF INFILTRATION.  WHEN SITE SECURED IV CHANGED TO HL--    ~~1008 TOÑO CN IN TO AROM--  COPIOUS CLEAR FLUID RELEASED.     ~~ 1017 PT UP TO BR TO VOID QS W/O DIFF    ~~1026 PT BACK TO BED    ~~1038  FHT NOT TRACING- EFM RPOS-- FOB HAS FINALLY ARRIVED    ~~1100 PT SITTING IN BED, WATCHING VIDEOS- NO NEEDS VOICED    ~~1130 REPORT GIVEN TO YENI AC FOR RELIEF

## 2018-08-24 NOTE — L&D DELIVERY NOTE
Delivery Summary    Baby girl Gely Benton was a late JENNY to [address] [place] [address],[address] at 39+6wga. PNC obtained with another practice. IOL for polyhydramnios at term. On arrival, SVE 5/70/-1, irregular contractions, augmented with AROM, clear fluid. Salomon Person labored well with natural birth methods. Spontaneous maternal pushing effort preceded by variables to 60bpm, recovery to baseline.  of a live female infant without anesthesia. Infant delivered OA position, no nuchal cord, and clear fluid. Body delivered without difficulty. Terminal mec noted. [place] to mothers abdomen, after cord pulse cessation, cord was doubly clamped and cut by FOB. Placenta delivered spontaneously via Evia Spore, complete. Fundus firm, with minimal bleeding. Placenta inspected and intact, 3 vessel cord with marginal insertion. Of note, 1x2cm calcification observed. Perineum and vagina inspected and pt was found to be intact, hemostasis maintained. Infant with Apgars 8/9. EBL 150cc. Pt tolerated procedure well, mom and baby skin to skin, breastfeeding, recovering in LDR. Dr. Juan Andres notified. Patient: Power Ferguson MRN: [id number]  SSN: [id number]    YOB: 1985  Age: 29 y.o.   Sex: female        Labor Events:    Labor: No    Rupture Date: 2018    Rupture Time: 1:39 PM    Rupture Type: AROM    Amniotic Fluid Volume: Polyhydramic     Amniotic Fluid Description:  Amniotic Fluid Odor: Clear    None     Induction: AROM         Induction Date:        Induction Time:       Indications for Induction: Other(comment)     Augmentation: None    Augmentation Date:      Augmentation Time:      Indications for Augmentation:      Cervical Ripening:       None    Rupture Identifier:       Labor complications: None     Additional complications:           Delivery Events:  Episiotomy: None    Indications for Episiotomy:      Laceration(s): None       Repaired: None     Number of Repair Packets:      Suture Type and Size: None        Estimated Blood Loss (ml): 150        Information for the patient's :  Yair Oreilly [[id number]]     Delivery Summary - Baby    Delivery Date: 2018   Delivery Time: 1:46 PM   Delivery Type: Vaginal, Spontaneous Delivery  Sex:  female  Gestational Age: 37w0d  Delivery Clinician:  Edwyna Lesches  Living?: Living   Delivery Location: L&D             APGARS  One minute Five minutes Ten minutes   Skin Color: 0    1       Heart Rate: 2   2         Reflex Irritability: 2   2         Muscle Tone: 2   2       Respiration: 2   2         Total: 8   9           Presentation: Vertex  Position: Left Occiput Anterior  Resuscitation Method:  None     Meconium Stained: None    Cord Information: 3 Vessels   Complications: None  Cord Blood Sent?:  Yes    Blood Gases Sent?:  No    Placenta:  Date/Time:   1:51 PM  Removal: Spontaneous      Appearance: Normal     [place] Measurements:  Birth Weight:    Pending  Birth Length:     Head Circumference:       Chest Circumference:      Abdominal Girth:       Other Providers:   Rashmi DÍAZ;HOLLY JAIME;RAMIREZ DAMIAN;MADELIN GONZALEZ Midwife;Primary Nurse;Primary  Nurse;Midwife           Cord Blood Results:  Information for the patient's :  BG Nav Pérez [[id number]]   No results found for: ABORH, PCTABR, PCTDIG, BILI, ABORHEXT, ABORH    Information for the patient's :  BG Nav Omer [[id number]]   No results found for: APH, APCO2, APO2, AHCO3, ABEC, ABDC, O2ST, [place], [place], PHI, [place], PO2I, HCO3I, SO2I, IBD     Information for the patient's :  BG Nav Pérez [[id number]]   No results found for: EPHV, PCO2V, PO2V, HCO3V, O2STV, EBDV

## 2018-08-24 NOTE — PROGRESS NOTES
Labor progress note      Pt desires unmedicated birth. She prefers hands and knees position at this time, breathing and working through contractions, relaxed in between. No VB, leaking clear fluid.        Vitals:  Visit Vitals    /86 (BP 1 Location: Right arm, BP Patient Position: At rest)    Pulse 73    Temp 98.5 °F (36.9 °C)    Resp 17       Temp (24hrs), Av.3 °F (36.8 °C), Min:98.1 °F (36.7 °C), Max:98.5 °F (36.9 °C)      Labs:  WBC   Date/Time Value Ref Range Status   2018 10:00 AM 10.5 4.6 - 13.2 K/uL Final   2015 08:25 AM 8.5 4.6 - 13.2 K/uL Final   2015 12:53 AM 10.6 4.6 - 13.2 K/uL Final     HGB   Date/Time Value Ref Range Status   2018 10:00 AM 11.0 (L) 12.0 - 16.0 g/dL Final   2015 04:45 AM 10.1 (L) 12.0 - 16.0 g/dL Final   2015 08:25 AM 10.9 (L) 12.0 - 16.0 g/dL Final     PLATELET   Date/Time Value Ref Range Status   2018 10:00  135 - 420 K/uL Final     Hgb, External   Date/Time Value Ref Range Status   2014 10.9  Final     Platelet cnt., External   Date/Time Value Ref Range Status   2014 226  Final           Physical Exam:  Cervical Exam:  80/-1  Membranes:  S/p AROM  Uterine Activity:  Cannot interpret, IUPC placed, q2-3min visually/palpated in room  Fetal Heart Rate:  130s, mod variability     Assessment:   34yo  at 40.0wks, IOL polyhydramnios    Plan:   Reassuring fetal status   IUPC placed  Continue to monitor closely, anticipate            Vicente Aguero MD

## 2018-08-24 NOTE — PROGRESS NOTES
1321: HALEIGH Michaud in room to assess patient. 1334: PETTY Triana in room. 1339: Patient complete, start pushing. 1341: Nursery RN in room    1346:  of VFI with spontaneous respirations. Baby to maternal abdomen. Baby being tended to by nursery nurse. 1348: Cord clamped and cut.    1351: Spontaneous delivery of placenta. 1352: Perineum inspected by CNM. Perineum intact. 1355: Ema-care done, Ice pack applied, Baby remains skin to skin for magic hour, side rails up and call light in reach, FOB at bedside. Patient instructed not to get for the first time without nurse at bedside and to call with increased bleeding. 1401: Called dietary for lunch tray. 1610: Patient moved to postpartum.

## 2018-08-24 NOTE — PROGRESS NOTES
Labor Progress Note  Joanna Pizarro is comfortable and plans to labor unmedicated  She had 2 previous  without epidural, and reports she labored about 3 hours from onset of contractions to delivery. She expresses a concern about increased anxiety in second stage, d/t hx of 3rd degree lac following rapid delivery of first baby. Fetal heart rate and contraction pattern evaluated, patient examined. Physical Exam:  Cervical Exam:  5/80/-1/Mid  Membranes:  Artificial Rupture of Membranes; Amniotic Fluid: large amount of clear fluid  Uterine Activity: irregular  Fetal Heart Rate: Baseline: 130 per minute  Variability: moderate  Accelerations: yes  Decelerations: none    Assessment/Plan:  IUPat 40wga, FHR category I, Labor induction for polyhydramnios, AROM, Pitocin to augment labor if indicated, anticipate vaginal delivery.

## 2018-08-24 NOTE — ROUTINE PROCESS
TRANSFER - IN REPORT:    Verbal report received from [place] (name) on Cinda Lav  being received from Labor and Delivery (unit) for routine progression of care      Report consisted of patients Situation, Background, Assessment and   Recommendations(SBAR). Information from the following report(s) SBAR, Kardex, Intake/Output and Recent Results was reviewed with the receiving nurse. Opportunity for questions and clarification was provided. Assessment completed upon patients arrival to unit and care assumed. 1819: Mother doing well, up without complaints, voiding without problems. Has voided twice post delivery, and ahd BM. Awaiting third void. Pain managed well with ibuprofen. Bonding well with baby.

## 2018-08-24 NOTE — H&P
Obstetric Admission History and Physical    Name: Gloria Daly MRN: [id number] SSN: [id number]    YOB: 1985  Age: 29 y.o. Sex: female       Subjective:      Chief complaint:    Chief Complaint   Patient presents with    Scheduled Induction       Tucker Dumas is a 29 y.o.  female, G 3 P 2 who presents at 40.0 weeks gestation with induction of labor secondary to polyhydramnios. SDP is 9.9 on sono yesterday. On admission EFM strip is obtained and is Category 1.     OB HISTORY  Prenatal care started at 39.6 at [address]  Transfer of care - see prenatal record for visit. Seen and sono at [address] yesterday with poly at 9.9 cms/ EFW of 64%. Problems of pregnancy include hx of PPD - not treated last pregnancy,  Hx of anxiety takes prozac daily and ritalin prn per pt. GBS negative.       PAST PREGNANCY HISTORY   41.6 M 7 lb 11 oz  no anesthesia  In [place]   41.2 3.5 hrs M   none [facility]     PAST MEDICAL / SURGICAL HISTORY  Past Medical History:   Diagnosis Date    Anxiety     Obesity     Postpartum depression      Problem List as of 2018  Date Reviewed: 2018          Codes Class Noted - Resolved    * (Principal)Labor and delivery indication for care or intervention ICD-10-CM: O75.9  ICD-9-CM: 659.90  2018 - Present        History of postpartum depression ICD-10-CM: Z87.59, Z86.59  ICD-9-CM: V13.29, V11.8  2018 - Present        Anxiety ICD-10-CM: F41.9  ICD-9-CM: 300.00  2018 - Present        Obesity ICD-10-CM: E66.9  ICD-9-CM: 278.00  2018 - Present    polyhydramnios      RESOLVED: Postpartum depression ICD-10-CM: F53  ICD-9-CM: 648.44, 311  2018 - 2018        RESOLVED: Supervision of other normal pregnancy ICD-10-CM: Z34.80  ICD-9-CM: V22.1  2015 - 2018              FAMILY/ SOCIAL HISTORY  Social History     Social History    Marital status:      Spouse name: Hugo Haines Number of children: 1    Years of education: N/A Occupational History    Not on file. Social History Main Topics    Smoking status: Never Smoker    Smokeless tobacco: Never Used    Alcohol use No    Drug use: No    Sexual activity: Yes     Partners: Male     Birth control/ protection: None     Other Topics Concern    Not on file     Social History Narrative          ALLERGY:  Allergies   Allergen Reactions    Egg Derived Itching and Swelling    Amoxicillin Hives    Pcn [Penicillins] Hives       Review of Systems:  A comprehensive review of systems was negative      Objective:     VITAL SIGNS:  There were no vitals taken for this visit. Physical Exam:  Abdomen: soft  Position of baby vtx  Estimated fetal weight 8 lbs  Contractions q none   FHR baseline at  130 bpm/ variability mod/Category 1  External Genitalia: normal general appearance without lesions  Cervix: Dilation: 5cm/ 70% / -1  Membranes  intact    Assessment:     1) 40.0 weeks Intrauterine Pregnancy . 2) induction of labor      Plan:     Reassuring fetal status, Continue plan for vaginal delivery, discussed AROM vas breast pump vs pitocin. Britt Gees is open for all. Discuss head well applied , ok for AROM. If no response , then pitocin. Agrees. Dr Katia Youngblood MD  notified and aware of admission and plan of care.      Signed By:  Florencio Paez CNM     August 24, 2018

## 2018-08-25 LAB
HCT VFR BLD AUTO: 31.7 % (ref 35–45)
HGB BLD-MCNC: 10.5 G/DL (ref 12–16)

## 2018-08-25 PROCEDURE — 74011250637 HC RX REV CODE- 250/637: Performed by: ADVANCED PRACTICE MIDWIFE

## 2018-08-25 PROCEDURE — 85018 HEMOGLOBIN: CPT | Performed by: ADVANCED PRACTICE MIDWIFE

## 2018-08-25 PROCEDURE — 36415 COLL VENOUS BLD VENIPUNCTURE: CPT | Performed by: ADVANCED PRACTICE MIDWIFE

## 2018-08-25 PROCEDURE — 85014 HEMATOCRIT: CPT | Performed by: ADVANCED PRACTICE MIDWIFE

## 2018-08-25 PROCEDURE — 65270000029 HC RM PRIVATE

## 2018-08-25 RX ADMIN — FLUOXETINE 20 MG: 20 CAPSULE ORAL at 08:40

## 2018-08-25 RX ADMIN — ACETAMINOPHEN 650 MG: 325 TABLET, FILM COATED ORAL at 11:15

## 2018-08-25 RX ADMIN — IBUPROFEN 800 MG: 400 TABLET ORAL at 08:40

## 2018-08-25 RX ADMIN — ACETAMINOPHEN 650 MG: 325 TABLET, FILM COATED ORAL at 22:24

## 2018-08-25 RX ADMIN — ACETAMINOPHEN 650 MG: 325 TABLET, FILM COATED ORAL at 18:05

## 2018-08-25 RX ADMIN — IBUPROFEN 800 MG: 400 TABLET ORAL at 18:05

## 2018-08-25 NOTE — LACTATION NOTE
Mother breast fed two other babies. Mom states this baby has been nursing well and baby is not yet 25 hours old. Experienced mother. Reviewed latch, positioning, colostrum, feeding frequency, hunger vs urge to suck, diapers, milk coming in and nipple care. General discussion, questions answered. Gave BF information,daily log and resource guide. Encouraged to ask for assistance if needed.

## 2018-08-25 NOTE — ROUTINE PROCESS
Bedside and Verbal shift change report given to DARYL Hernandez rn (oncoming nurse) by ANGEL Granda rn (offgoing nurse). Report included the following information SBAR, Kardex, Intake/Output, MAR and Recent Results. Patient aware of hourly rounding and not waking patient if sleeping. 56- Mother doing well, up without complaints, voiding without problems. Pain managed well with motrin and tylenol. Bonding well with baby.

## 2018-08-25 NOTE — PROGRESS NOTES
PPD # 1    Patient doing well post-partum without significant complaint. Voiding without difficulty, normal lochia. Breastfeeding well. Baby stable. Vitals:  No data found. Temp (24hrs), Av.1 °F (36.7 °C), Min:97.8 °F (36.6 °C), Max:98.5 °F (36.9 °C)      Vital signs stable, afebrile. Exam:  Patient without distress. Breasts intact and nontender               Abdomen soft, fundus firm at level of umbilicus, nontender               Perineum with normal lochia noted. Lower extremities are negative for swelling, cords or tenderness. Lab/Data Review:  CBC:   Lab Results   Component Value Date/Time    WBC 10.5 2018 10:00 AM    HGB 10.5 (L) 2018 07:00 AM    HCT 31.7 (L) 2018 07:00 AM     2018 10:00 AM       Assessment and Plan:  Patient appears to be having uncomplicated post-partum course. Continue routine perineal care and maternal education. Plan discharge tomorrow if no problems occur.     Na Rowland CNM  2018  8:29 AM

## 2018-08-26 VITALS
RESPIRATION RATE: 17 BRPM | DIASTOLIC BLOOD PRESSURE: 76 MMHG | HEART RATE: 73 BPM | SYSTOLIC BLOOD PRESSURE: 120 MMHG | HEIGHT: 67 IN | TEMPERATURE: 98.7 F | BODY MASS INDEX: 45.99 KG/M2 | WEIGHT: 293 LBS | OXYGEN SATURATION: 99 %

## 2018-08-26 PROCEDURE — 74011250637 HC RX REV CODE- 250/637: Performed by: ADVANCED PRACTICE MIDWIFE

## 2018-08-26 PROCEDURE — 77030036554

## 2018-08-26 RX ORDER — IBUPROFEN 800 MG/1
800 TABLET ORAL
Qty: 30 TAB | Refills: 0 | Status: SHIPPED | OUTPATIENT
Start: 2018-08-26

## 2018-08-26 RX ADMIN — FLUOXETINE 20 MG: 20 CAPSULE ORAL at 11:27

## 2018-08-26 RX ADMIN — IBUPROFEN 800 MG: 400 TABLET ORAL at 02:26

## 2018-08-26 NOTE — ROUTINE PROCESS
Verbal shift change report given to Chaya John RN (oncoming nurse) by Ramona Morales RN (offgoing nurse). Report included the following information Kardex, Intake/Output, MAR and Recent Results. 0930--planning discharge for today--POC for discharge discussed--verbalizes understanding--will return for assessment. 1035--assessment done  1120--discharge instructions reviewed and signed--abdominal binder given per patient request.  1150--discharged via wheelchair with baby in car seat carrier. Baby transported home in a car seat.

## 2018-08-26 NOTE — DISCHARGE SUMMARY
Obstetrical Discharge Summary     Name: Angelika Wagoner MRN: [id number]  SSN: [id number]    YOB: 1985  Age: 29 y.o. Sex: female      Admit Date: 2018    Discharge Date: 2018     Admitting Physician: Citlalli Puente MD     Attending Physician:  Citlalli Puente MD     Admission Diagnoses: maternity  Labor and delivery indication for care or intervention    Discharge Diagnoses:   Information for the patient's :  Giulia Harkins [[id number]]   Delivery of a 3.629 kg female infant via Vaginal, Spontaneous Delivery on 2018 at 1:46 PM  by . Apgars were 8 and 9. Additional Diagnoses:   Hospital Problems  Date Reviewed: 2018          Codes Class Noted POA    History of postpartum depression ICD-10-CM: Z87.59, Z86.59  ICD-9-CM: V13.29, V11.8  2018 Yes        Anxiety ICD-10-CM: F41.9  ICD-9-CM: 300.00  2018 Yes        Obesity ICD-10-CM: E66.9  ICD-9-CM: 278.00  2018 Yes        * (Principal)Postpartum care following vaginal delivery ICD-10-CM: Z39.2  ICD-9-CM: V24.2  2018 No             Lab Results   Component Value Date/Time    Rubella, External immune 2014    GrBStrep, External negative 2015       Immunization(s): There is no immunization history for the selected administration types on file for this patient. Labs: No results found for this or any previous visit (from the past 24 hour(s)). Hgb 11 -> 10.5    Exam:  Genera:  Alert & oriented, no apparent distress  CV:  RRR  Pulm:  CTAB  Abdomen:  Soft, non distended, nontender to palpation, firm fundus below umbilicus  Extremities:  LE b/l without clubbing/cyanosis/edema/redness, nontender to palpation    Hospital Course: 29 y.o. [id number] PPD#2 s/p  at 40.0wks after IOL for polyhydramnios. Patient was a transfer of care at 39.6wks and found to have polyhydramnios.   She had known fetal UTD with plans to follow up with pediatric urology postpartum and was consulted by HAYDEN during her prenatal care. Patient also with h/o PPD and anxiety. She is already following with psych and is currently stable on Prozac 20mg. Denies current signs or symptoms of depression, but is aware when to call. Postpartum patient doing well. Ambulating, voiding, tolerating regular diet, minimal lochia, pain well controlled with po pain meds. Denies CP, SOB, N/V/D, fevers, chills, HA, visual changes, RUQ/epigastric pain. Desires to go home, discharge instructions and precautions reviewed. Patient Instructions:   Current Discharge Medication List      START taking these medications    Details   ibuprofen (MOTRIN) 800 mg tablet Take 1 Tab by mouth every eight (8) hours as needed. Qty: 30 Tab, Refills: 0         CONTINUE these medications which have NOT CHANGED    Details   FLUoxetine (PROZAC) 20 mg capsule Take 20 mg by mouth daily. Indications: ADD/anxiety      Calcium-Cholecalciferol, D3, (CALCIUM 600 WITH VITAMIN D3) 600 mg(1,500mg) -400 unit cap Take  by mouth daily. omega-3 fatty acids-vitamin e (FISH OIL) 1,000 mg cap Take 1 Cap by mouth. Indications: 63347 mg      pnv w/o calcium-iron fum-fa 27-1 mg tab Take  by mouth. STOP taking these medications       methylphenidate HCl (RITALIN) 10 mg tablet Comments:   Reason for Stopping:               Reference my discharge instructions. Follow-up Appointments   Procedures    FOLLOW UP VISIT Appointment in: 6 Weeks Follow up for PPV in 6wks or sooner as indicated. Follow up for PPV in 6wks or sooner as indicated.      Standing Status:   Standing     Number of Occurrences:   1     Order Specific Question:   Appointment in     Answer:   6 Weeks        Signed By:  Tu Conner MD     August 26, 2018

## 2018-08-26 NOTE — PROGRESS NOTES
Hourly rounds completed for 1900 to 2300  Hourly rounds completed for 2300 to 0300  Hourly rounds completed for 0300 to 0700

## 2018-08-26 NOTE — DISCHARGE INSTRUCTIONS

## 2019-07-02 ENCOUNTER — HOSPITAL ENCOUNTER (OUTPATIENT)
Dept: LAB | Age: 34
Discharge: HOME | End: 2019-07-02
Payer: COMMERCIAL

## 2019-07-02 PROCEDURE — 87624 HPV HI-RISK TYP POOLED RSLT: CPT

## 2019-07-02 PROCEDURE — 88175 CYTOPATH C/V AUTO FLUID REDO: CPT

## 2022-03-18 PROBLEM — E66.9 OBESITY: Status: ACTIVE | Noted: 2018-08-24

## 2022-03-19 PROBLEM — Z86.59 HISTORY OF POSTPARTUM DEPRESSION: Status: ACTIVE | Noted: 2018-08-24

## 2022-03-19 PROBLEM — Z87.59 HISTORY OF POSTPARTUM DEPRESSION: Status: ACTIVE | Noted: 2018-08-24

## 2022-03-19 PROBLEM — F41.9 ANXIETY: Status: ACTIVE | Noted: 2018-08-24

## 2023-06-20 ENCOUNTER — HOSPITAL ENCOUNTER (OUTPATIENT)
Facility: HOSPITAL | Age: 38
Setting detail: SPECIMEN
Discharge: HOME | End: 2023-06-23
Payer: COMMERCIAL

## 2023-06-20 PROCEDURE — 88175 CYTOPATH C/V AUTO FLUID REDO: CPT

## 2023-12-21 PROBLEM — D64.9 ANEMIA, UNSPECIFIED: Status: ACTIVE | Noted: 2023-12-21

## 2023-12-21 RX ORDER — ONDANSETRON 2 MG/ML
8 INJECTION INTRAMUSCULAR; INTRAVENOUS
Status: CANCELLED | OUTPATIENT
Start: 2023-12-29

## 2023-12-21 RX ORDER — DIPHENHYDRAMINE HYDROCHLORIDE 50 MG/ML
50 INJECTION INTRAMUSCULAR; INTRAVENOUS
Status: CANCELLED | OUTPATIENT
Start: 2023-12-29

## 2023-12-21 RX ORDER — SODIUM CHLORIDE 9 MG/ML
5-250 INJECTION, SOLUTION INTRAVENOUS PRN
Status: CANCELLED | OUTPATIENT
Start: 2023-12-29

## 2023-12-21 RX ORDER — ALBUTEROL SULFATE 90 UG/1
4 AEROSOL, METERED RESPIRATORY (INHALATION) PRN
Status: CANCELLED | OUTPATIENT
Start: 2023-12-29

## 2023-12-21 RX ORDER — SODIUM CHLORIDE 0.9 % (FLUSH) 0.9 %
5-40 SYRINGE (ML) INJECTION PRN
Status: CANCELLED | OUTPATIENT
Start: 2023-12-29

## 2023-12-21 RX ORDER — HEPARIN 100 UNIT/ML
500 SYRINGE INTRAVENOUS PRN
Status: CANCELLED | OUTPATIENT
Start: 2023-12-29

## 2023-12-21 RX ORDER — ACETAMINOPHEN 325 MG/1
650 TABLET ORAL
Status: CANCELLED | OUTPATIENT
Start: 2023-12-29

## 2023-12-21 RX ORDER — EPINEPHRINE 1 MG/ML
0.3 INJECTION, SOLUTION, CONCENTRATE INTRAVENOUS PRN
Status: CANCELLED | OUTPATIENT
Start: 2023-12-29

## 2023-12-21 RX ORDER — SODIUM CHLORIDE 9 MG/ML
INJECTION, SOLUTION INTRAVENOUS CONTINUOUS
Status: CANCELLED | OUTPATIENT
Start: 2023-12-29

## 2023-12-29 ENCOUNTER — HOSPITAL ENCOUNTER (OUTPATIENT)
Facility: HOSPITAL | Age: 38
Setting detail: INFUSION SERIES
End: 2023-12-29

## 2023-12-29 DIAGNOSIS — D64.9 ANEMIA, UNSPECIFIED TYPE: Primary | ICD-10-CM

## 2024-01-05 ENCOUNTER — HOSPITAL ENCOUNTER (OUTPATIENT)
Facility: HOSPITAL | Age: 39
Setting detail: INFUSION SERIES
End: 2024-01-05
Payer: COMMERCIAL

## 2024-01-05 VITALS
RESPIRATION RATE: 20 BRPM | OXYGEN SATURATION: 97 % | DIASTOLIC BLOOD PRESSURE: 70 MMHG | TEMPERATURE: 98.6 F | SYSTOLIC BLOOD PRESSURE: 111 MMHG | HEART RATE: 55 BPM

## 2024-01-05 DIAGNOSIS — D64.9 ANEMIA, UNSPECIFIED TYPE: Primary | ICD-10-CM

## 2024-01-05 PROCEDURE — 2580000003 HC RX 258: Performed by: OBSTETRICS & GYNECOLOGY

## 2024-01-05 PROCEDURE — 96374 THER/PROPH/DIAG INJ IV PUSH: CPT

## 2024-01-05 PROCEDURE — 6360000002 HC RX W HCPCS: Performed by: OBSTETRICS & GYNECOLOGY

## 2024-01-05 PROCEDURE — 96365 THER/PROPH/DIAG IV INF INIT: CPT

## 2024-01-05 RX ORDER — SODIUM CHLORIDE 9 MG/ML
5-250 INJECTION, SOLUTION INTRAVENOUS PRN
OUTPATIENT
Start: 2024-01-11

## 2024-01-05 RX ORDER — SODIUM CHLORIDE 0.9 % (FLUSH) 0.9 %
5-40 SYRINGE (ML) INJECTION PRN
OUTPATIENT
Start: 2024-01-11

## 2024-01-05 RX ORDER — SODIUM CHLORIDE 9 MG/ML
INJECTION, SOLUTION INTRAVENOUS CONTINUOUS
OUTPATIENT
Start: 2024-01-11

## 2024-01-05 RX ORDER — DIPHENHYDRAMINE HYDROCHLORIDE 50 MG/ML
50 INJECTION INTRAMUSCULAR; INTRAVENOUS
OUTPATIENT
Start: 2024-01-11

## 2024-01-05 RX ORDER — ACETAMINOPHEN 325 MG/1
650 TABLET ORAL
OUTPATIENT
Start: 2024-01-11

## 2024-01-05 RX ORDER — ALBUTEROL SULFATE 90 UG/1
4 AEROSOL, METERED RESPIRATORY (INHALATION) PRN
OUTPATIENT
Start: 2024-01-11

## 2024-01-05 RX ORDER — HEPARIN 100 UNIT/ML
500 SYRINGE INTRAVENOUS PRN
OUTPATIENT
Start: 2024-01-11

## 2024-01-05 RX ORDER — SODIUM CHLORIDE 9 MG/ML
5-250 INJECTION, SOLUTION INTRAVENOUS PRN
Status: ACTIVE | OUTPATIENT
Start: 2024-01-05 | End: 2024-01-06

## 2024-01-05 RX ORDER — EPINEPHRINE 1 MG/ML
0.3 INJECTION, SOLUTION, CONCENTRATE INTRAVENOUS PRN
OUTPATIENT
Start: 2024-01-11

## 2024-01-05 RX ORDER — SODIUM CHLORIDE 0.9 % (FLUSH) 0.9 %
5-40 SYRINGE (ML) INJECTION PRN
Status: ACTIVE | OUTPATIENT
Start: 2024-01-05 | End: 2024-01-06

## 2024-01-05 RX ORDER — ONDANSETRON 2 MG/ML
8 INJECTION INTRAMUSCULAR; INTRAVENOUS
OUTPATIENT
Start: 2024-01-11

## 2024-01-05 RX ADMIN — SODIUM CHLORIDE, PRESERVATIVE FREE 10 ML: 5 INJECTION INTRAVENOUS at 13:40

## 2024-01-05 RX ADMIN — SODIUM CHLORIDE 100 MG: 9 INJECTION, SOLUTION INTRAVENOUS at 13:55

## 2024-01-05 RX ADMIN — SODIUM CHLORIDE 25 ML/HR: 9 INJECTION, SOLUTION INTRAVENOUS at 13:40

## 2024-01-12 ENCOUNTER — HOSPITAL ENCOUNTER (OUTPATIENT)
Facility: HOSPITAL | Age: 39
Setting detail: INFUSION SERIES
End: 2024-01-12
Payer: COMMERCIAL

## 2024-01-16 ENCOUNTER — HOSPITAL ENCOUNTER (OUTPATIENT)
Facility: HOSPITAL | Age: 39
Setting detail: INFUSION SERIES
Discharge: HOME | End: 2024-01-19
Payer: COMMERCIAL

## 2024-01-16 VITALS
HEART RATE: 76 BPM | RESPIRATION RATE: 18 BRPM | DIASTOLIC BLOOD PRESSURE: 65 MMHG | SYSTOLIC BLOOD PRESSURE: 103 MMHG | TEMPERATURE: 97.7 F | OXYGEN SATURATION: 97 %

## 2024-01-16 DIAGNOSIS — D64.9 ANEMIA, UNSPECIFIED TYPE: Primary | ICD-10-CM

## 2024-01-16 PROCEDURE — 2580000003 HC RX 258: Performed by: OBSTETRICS & GYNECOLOGY

## 2024-01-16 PROCEDURE — 96365 THER/PROPH/DIAG IV INF INIT: CPT

## 2024-01-16 PROCEDURE — 6360000002 HC RX W HCPCS: Performed by: OBSTETRICS & GYNECOLOGY

## 2024-01-16 RX ORDER — SODIUM CHLORIDE 9 MG/ML
5-250 INJECTION, SOLUTION INTRAVENOUS PRN
Status: ACTIVE | OUTPATIENT
Start: 2024-01-16 | End: 2024-01-17

## 2024-01-16 RX ORDER — SODIUM CHLORIDE 0.9 % (FLUSH) 0.9 %
5-40 SYRINGE (ML) INJECTION PRN
OUTPATIENT
Start: 2024-01-16

## 2024-01-16 RX ORDER — SODIUM CHLORIDE 9 MG/ML
5-250 INJECTION, SOLUTION INTRAVENOUS PRN
OUTPATIENT
Start: 2024-01-16

## 2024-01-16 RX ORDER — HEPARIN 100 UNIT/ML
500 SYRINGE INTRAVENOUS PRN
OUTPATIENT
Start: 2024-01-16

## 2024-01-16 RX ORDER — ALBUTEROL SULFATE 90 UG/1
4 AEROSOL, METERED RESPIRATORY (INHALATION) PRN
OUTPATIENT
Start: 2024-01-16

## 2024-01-16 RX ORDER — DIPHENHYDRAMINE HYDROCHLORIDE 50 MG/ML
50 INJECTION INTRAMUSCULAR; INTRAVENOUS
OUTPATIENT
Start: 2024-01-16

## 2024-01-16 RX ORDER — ONDANSETRON 2 MG/ML
8 INJECTION INTRAMUSCULAR; INTRAVENOUS
OUTPATIENT
Start: 2024-01-16

## 2024-01-16 RX ORDER — ACETAMINOPHEN 325 MG/1
650 TABLET ORAL
OUTPATIENT
Start: 2024-01-16

## 2024-01-16 RX ORDER — SODIUM CHLORIDE 9 MG/ML
INJECTION, SOLUTION INTRAVENOUS CONTINUOUS
OUTPATIENT
Start: 2024-01-16

## 2024-01-16 RX ORDER — EPINEPHRINE 1 MG/ML
0.3 INJECTION, SOLUTION, CONCENTRATE INTRAVENOUS PRN
OUTPATIENT
Start: 2024-01-16

## 2024-01-16 RX ORDER — SODIUM CHLORIDE 0.9 % (FLUSH) 0.9 %
5-40 SYRINGE (ML) INJECTION PRN
Status: ACTIVE | OUTPATIENT
Start: 2024-01-16 | End: 2024-01-17

## 2024-01-16 RX ADMIN — SODIUM CHLORIDE 25 ML/HR: 9 INJECTION, SOLUTION INTRAVENOUS at 14:30

## 2024-01-16 RX ADMIN — IRON SUCROSE 100 MG: 20 INJECTION, SOLUTION INTRAVENOUS at 14:34

## 2024-01-16 RX ADMIN — SODIUM CHLORIDE, PRESERVATIVE FREE 10 ML: 5 INJECTION INTRAVENOUS at 14:27

## 2024-01-16 NOTE — PROGRESS NOTES
[facility] Progress Note    Date: 2024    Name: Ana Khan    MRN: [id number]         : 1985    VENOFER 100MG DOSE 3 OF 3 (DOSE #1, Initial dose received in hospital)      Ms. Khan arrived to [facility] at 1415, ambulatory and in stable condition. She is here for DOSE 3 OF 3, IV VENOFER INFUSION.     Ms. Khan was assessed and education was provided. Ms. Khan advised she tolerated previous VENOFER infusions well and without signs and symptoms of adverse reaction.      Ms. Khan's vitals were reviewed.    Patient Vitals for the past 12 hrs:   Temp Pulse Resp BP SpO2   24 1522 97.7 °F (36.5 °C) 76 18 103/65 97 %   24 1452 97.8 °F (36.6 °C) 79 18 101/71 100 %   24 1415 98.6 °F (37 °C) 70 18 104/64 99 %            22g PIV inserted in patient's Left AC on first attempt. Brisk blood return noted and flushed easily without incident.     NS 250mL IV bag was initiated to infuse @ KVO throughout treatment today.     Iron Sucrose (Venofer) 100mg IV was administered over approximately 15 min. per order & without incident.     After completion of Venofer infusion, Ms. Khan was kept for approximately 30 min of observation as a precaution. The NS continued to infuse @ KVO rate throughout the observation period.     After completion of the observation period, her PIV was removed & bleeding controlled with gauze/ coban dressing.     Ms. Khan tolerated well without complaints.    Discharge instructions reviewed with patient, patient verbalized understanding.     Patient's armband removed and shredded.     [facility] at 1526.  She has completed her treatment and does not have any further appointments at this time.       Joanne Marrufo RN  2024